# Patient Record
Sex: MALE | Race: WHITE | NOT HISPANIC OR LATINO | Employment: OTHER | URBAN - METROPOLITAN AREA
[De-identification: names, ages, dates, MRNs, and addresses within clinical notes are randomized per-mention and may not be internally consistent; named-entity substitution may affect disease eponyms.]

---

## 2019-03-14 ENCOUNTER — HOSPITAL ENCOUNTER (OUTPATIENT)
Dept: RADIOLOGY | Facility: MEDICAL CENTER | Age: 64
End: 2019-03-14
Attending: NURSE PRACTITIONER
Payer: COMMERCIAL

## 2019-03-14 ENCOUNTER — OFFICE VISIT (OUTPATIENT)
Dept: URGENT CARE | Facility: PHYSICIAN GROUP | Age: 64
End: 2019-03-14
Payer: COMMERCIAL

## 2019-03-14 VITALS
HEIGHT: 71 IN | WEIGHT: 200 LBS | DIASTOLIC BLOOD PRESSURE: 80 MMHG | HEART RATE: 61 BPM | RESPIRATION RATE: 14 BRPM | BODY MASS INDEX: 28 KG/M2 | SYSTOLIC BLOOD PRESSURE: 122 MMHG | OXYGEN SATURATION: 96 % | TEMPERATURE: 97.9 F

## 2019-03-14 DIAGNOSIS — M54.42 ACUTE LEFT-SIDED LOW BACK PAIN WITH LEFT-SIDED SCIATICA: Primary | ICD-10-CM

## 2019-03-14 DIAGNOSIS — M54.42 ACUTE LEFT-SIDED LOW BACK PAIN WITH LEFT-SIDED SCIATICA: ICD-10-CM

## 2019-03-14 DIAGNOSIS — S39.012A STRAIN OF LUMBAR REGION, INITIAL ENCOUNTER: ICD-10-CM

## 2019-03-14 PROCEDURE — 72100 X-RAY EXAM L-S SPINE 2/3 VWS: CPT

## 2019-03-14 PROCEDURE — 99204 OFFICE O/P NEW MOD 45 MIN: CPT | Performed by: NURSE PRACTITIONER

## 2019-03-14 RX ORDER — HYDROCODONE BITARTRATE AND ACETAMINOPHEN 5; 325 MG/1; MG/1
1 TABLET ORAL EVERY 8 HOURS PRN
Qty: 10 TAB | Refills: 0 | Status: SHIPPED | OUTPATIENT
Start: 2019-03-14 | End: 2019-03-18

## 2019-03-14 RX ORDER — LISINOPRIL 20 MG/1
20 TABLET ORAL DAILY
COMMUNITY

## 2019-03-14 RX ORDER — METHYLPREDNISOLONE 4 MG/1
TABLET ORAL
Qty: 1 KIT | Refills: 0 | Status: SHIPPED | OUTPATIENT
Start: 2019-03-14

## 2019-03-14 RX ORDER — CYCLOBENZAPRINE HCL 10 MG
10 TABLET ORAL 3 TIMES DAILY PRN
Qty: 30 TAB | Refills: 0 | Status: SHIPPED | OUTPATIENT
Start: 2019-03-14

## 2019-03-14 ASSESSMENT — ENCOUNTER SYMPTOMS
NUMBNESS: 0
BACK PAIN: 1
NEUROLOGICAL NEGATIVE: 1
SENSORY CHANGE: 0
BOWEL INCONTINENCE: 0
WEAKNESS: 0
PARESTHESIAS: 0
RESPIRATORY NEGATIVE: 1
TINGLING: 0
PARESIS: 0
CARDIOVASCULAR NEGATIVE: 1
CONSTITUTIONAL NEGATIVE: 1
FOCAL WEAKNESS: 0
PERIANAL NUMBNESS: 0

## 2019-03-14 NOTE — PROGRESS NOTES
Subjective:     Wallace Khalil is a 63 y.o. male who presents for Back Pain (going down left leg poss sciatica)       Back Pain   This is a chronic problem. Episode onset: 2 days ago. The problem has been gradually worsening since onset. The pain is at a severity of 7/10. Pertinent negatives include no bladder incontinence, bowel incontinence, numbness, paresis, paresthesias, perianal numbness, tingling or weakness. He has tried NSAIDs and analgesics (acetaminophen) for the symptoms. The treatment provided no relief.   Patient reports a history of chronic back pain for several years.  States that in 2012 he had imaging done of his lower back which showed herniation of L5-S1.  He states ever since his back pain has been stable.  Denies surgeries in his lower back.  He states that about 2 weeks ago he pulled something heavy and sprained his lower back.  About 2 days ago he started to experience acute left lower back pain radiating down the back of his left leg.    PMH:  has no past medical history on file.    MEDS:   Current Outpatient Prescriptions:   •  lisinopril (PRINIVIL) 20 MG Tab, Take 20 mg by mouth every day., Disp: , Rfl:   •  HYDROcodone-acetaminophen (NORCO) 5-325 MG Tab per tablet, Take 1 Tab by mouth every 8 hours as needed (severe pain) for up to 4 days., Disp: 10 Tab, Rfl: 0  •  MethylPREDNISolone (MEDROL DOSEPAK) 4 MG Tablet Therapy Pack, Use as directed on package, Disp: 1 Kit, Rfl: 0  •  cyclobenzaprine (FLEXERIL) 10 MG Tab, Take 1 Tab by mouth 3 times a day as needed for Muscle Spasms., Disp: 30 Tab, Rfl: 0    ALLERGIES: No Known Allergies    SURGHX: No past surgical history on file.    SOCHX:  reports that he has never smoked. He has never used smokeless tobacco. He reports that he does not drink alcohol or use drugs.     FH: Reviewed with patient, not pertinent to this visit.     Review of Systems   Constitutional: Negative.    Respiratory: Negative.    Cardiovascular: Negative.   "  Gastrointestinal: Negative for bowel incontinence.   Genitourinary: Negative for bladder incontinence.   Musculoskeletal: Positive for back pain.   Neurological: Negative.  Negative for tingling, sensory change, focal weakness, weakness, numbness and paresthesias.   All other systems reviewed and are negative.    Objective:     /80   Pulse 61   Temp 36.6 °C (97.9 °F) (Temporal)   Resp 14   Ht 1.803 m (5' 11\")   Wt 90.7 kg (200 lb)   SpO2 96%   BMI 27.89 kg/m²     Physical Exam   Constitutional: He is oriented to person, place, and time. He appears well-developed and well-nourished. He is cooperative.  Non-toxic appearance. No distress.   HENT:   Head: Normocephalic and atraumatic.   Right Ear: External ear normal.   Left Ear: External ear normal.   Nose: Nose normal.   Mouth/Throat: Mucous membranes are normal.   Eyes: Conjunctivae and EOM are normal.   Neck: Normal range of motion.   Cardiovascular: Normal rate, regular rhythm, normal heart sounds and normal pulses.    Pulmonary/Chest: Effort normal and breath sounds normal. No respiratory distress. He has no decreased breath sounds.   Abdominal: Soft. Bowel sounds are normal. There is no tenderness.   Musculoskeletal: He exhibits no deformity.        Lumbar back: He exhibits decreased range of motion (limited due to pain), tenderness (more towards left) and pain. He exhibits no swelling, no deformity and no laceration.   Neurological: He is alert and oriented to person, place, and time. He has normal strength. He displays normal reflexes. No sensory deficit. Coordination and gait normal.   Skin: Skin is warm, dry and intact. Capillary refill takes less than 2 seconds.   Psychiatric: He has a normal mood and affect. His behavior is normal.   Vitals reviewed.    X-ray of lumbar spine:    Narrative     3/14/2019 11:18 AM    HISTORY/REASON FOR EXAM: Acute left  sciatica and lower back pain    TECHNIQUE/ EXAM DESCRIPTION AND NUMBER OF VIEWS:  3 views of " the lumbar spine.    COMPARISON: None.    FINDINGS:  There are 5 non-rib bearing lumbar type vertebral bodies.    No acute fracture is detected. The vertebral body heights are maintained.    Straightening of the normal lordosis    The alignment of the lumbar spine is notable for mild degenerative L2/3 retrolisthesis and grade 1 L1/2 anterolisthesis.    The intervertebral disk spaces are moderately narrowed at L2/3 where there is vacuum disc phenomenon. There is mild disc height loss at L3/4/5/S1.    Mild caudal facet arthropathy     Impression     Moderate multilevel degenerative disc disease greater than facet arthropathy with L1/2 grade 1 anterolisthesis, L2/3 retrolisthesis     Reading Provider Reading Date   Jordi Webber M.D. Mar 14, 2019     Signing Provider Signing Date Signing Time   Jordi Webber M.D. Mar 14, 2019 11:45 AM        Assessment/Plan:     1. Acute left-sided low back pain with left-sided sciatica  - DX-LUMBAR SPINE-2 OR 3 VIEWS; Future  - HYDROcodone-acetaminophen (NORCO) 5-325 MG Tab per tablet; Take 1 Tab by mouth every 8 hours as needed (severe pain) for up to 4 days.  Dispense: 10 Tab; Refill: 0  - Consent for Opiate Prescription  - cyclobenzaprine (FLEXERIL) 10 MG Tab; Take 1 Tab by mouth 3 times a day as needed for Muscle Spasms.  Dispense: 30 Tab; Refill: 0  - REFERRAL TO SPINE SURGERY    2. Strain of lumbar region, initial encounter  - MethylPREDNISolone (MEDROL DOSEPAK) 4 MG Tablet Therapy Pack; Use as directed on package  Dispense: 1 Kit; Refill: 0    Rx given as specified above. Pt advised to avoid NSAIDs while on steroids. Referral given for consultation. [place] and [place]  inquiries and Opioid Risk Tool show no increased risk of controlled substance abuse for this patient. Written consent received for prescription of controlled substance for adequate control of pain.    Patient advised to: Return for 1) Symptoms don't improve or worsen, or go to ER, 2) Follow up with  primary care in 7-10 days.    Differential diagnosis, natural history, supportive care, and indications for immediate follow-up discussed. All questions answered. Patient agrees with the plan of care.

## 2019-03-14 NOTE — PATIENT INSTRUCTIONS
Back Pain, Adult  Back pain is very common in adults. The cause of back pain is rarely dangerous and the pain often gets better over time. The cause of your back pain may not be known. Some common causes of back pain include:  · Strain of the muscles or ligaments supporting the spine.  · Wear and tear (degeneration) of the spinal disks.  · Arthritis.  · Direct injury to the back.  For many people, back pain may return. Since back pain is rarely dangerous, most people can learn to manage this condition on their own.  Follow these instructions at home:  Watch your back pain for any changes. The following actions may help to lessen any discomfort you are feeling:  · Remain active. It is stressful on your back to sit or  one place for long periods of time. Do not sit, drive, or  one place for more than 30 minutes at a time. Take short walks on even surfaces as soon as you are able. Try to increase the length of time you walk each day.  · Exercise regularly as directed by your health care provider. Exercise helps your back heal faster. It also helps avoid future injury by keeping your muscles strong and flexible.  · Do not stay in bed. Resting more than 1-2 days can delay your recovery.  · Pay attention to your body when you bend and lift. The most comfortable positions are those that put less stress on your recovering back. Always use proper lifting techniques, including:  ¨ Bending your knees.  ¨ Keeping the load close to your body.  ¨ Avoiding twisting.  · Find a comfortable position to sleep. Use a firm mattress and lie on your side with your knees slightly bent. If you lie on your back, put a pillow under your knees.  · Avoid feeling anxious or stressed. Stress increases muscle tension and can worsen back pain. It is important to recognize when you are anxious or stressed and learn ways to manage it, such as with exercise.  · Take medicines only as directed by your health care provider.  Over-the-counter medicines to reduce pain and inflammation are often the most helpful. Your health care provider may prescribe muscle relaxant drugs. These medicines help dull your pain so you can more quickly return to your normal activities and healthy exercise.  · Apply ice to the injured area:  ¨ Put ice in a plastic bag.  ¨ Place a towel between your skin and the bag.  ¨ Leave the ice on for 20 minutes, 2-3 times a day for the first 2-3 days. After that, ice and heat may be alternated to reduce pain and spasms.  · Maintain a healthy weight. Excess weight puts extra stress on your back and makes it difficult to maintain good posture.  Contact a health care provider if:  · You have pain that is not relieved with rest or medicine.  · You have increasing pain going down into the legs or buttocks.  · You have pain that does not improve in one week.  · You have night pain.  · You lose weight.  · You have a fever or chills.  Get help right away if:  · You develop new bowel or bladder control problems.  · You have unusual weakness or numbness in your arms or legs.  · You develop nausea or vomiting.  · You develop abdominal pain.  · You feel faint.  This information is not intended to replace advice given to you by your health care provider. Make sure you discuss any questions you have with your health care provider.  Document Released: 12/18/2006 Document Revised: 04/27/2017 Document Reviewed: 04/21/2015  [employer] Interactive Patient Education © 2017 [employer]

## 2019-03-25 DIAGNOSIS — J45.909 UNCOMPLICATED ASTHMA, UNSPECIFIED ASTHMA SEVERITY, UNSPECIFIED WHETHER PERSISTENT: ICD-10-CM

## 2019-04-09 ENCOUNTER — NON-PROVIDER VISIT (OUTPATIENT)
Dept: PULMONOLOGY | Facility: HOSPICE | Age: 64
End: 2019-04-09
Attending: INTERNAL MEDICINE
Payer: COMMERCIAL

## 2019-04-09 ENCOUNTER — OFFICE VISIT (OUTPATIENT)
Dept: PULMONOLOGY | Facility: HOSPICE | Age: 64
End: 2019-04-09
Payer: COMMERCIAL

## 2019-04-09 VITALS
WEIGHT: 199 LBS | HEIGHT: 71 IN | HEART RATE: 54 BPM | OXYGEN SATURATION: 96 % | RESPIRATION RATE: 14 BRPM | BODY MASS INDEX: 27.86 KG/M2 | SYSTOLIC BLOOD PRESSURE: 118 MMHG | TEMPERATURE: 97.7 F | DIASTOLIC BLOOD PRESSURE: 78 MMHG

## 2019-04-09 VITALS — WEIGHT: 199 LBS | HEIGHT: 71 IN | BODY MASS INDEX: 27.86 KG/M2

## 2019-04-09 DIAGNOSIS — J45.909 UNCOMPLICATED ASTHMA, UNSPECIFIED ASTHMA SEVERITY, UNSPECIFIED WHETHER PERSISTENT: ICD-10-CM

## 2019-04-09 DIAGNOSIS — J45.30 MILD PERSISTENT ASTHMA WITHOUT COMPLICATION: ICD-10-CM

## 2019-04-09 PROCEDURE — 94060 EVALUATION OF WHEEZING: CPT | Performed by: INTERNAL MEDICINE

## 2019-04-09 PROCEDURE — 99204 OFFICE O/P NEW MOD 45 MIN: CPT | Mod: 25 | Performed by: INTERNAL MEDICINE

## 2019-04-09 PROCEDURE — 94729 DIFFUSING CAPACITY: CPT | Performed by: INTERNAL MEDICINE

## 2019-04-09 PROCEDURE — 94726 PLETHYSMOGRAPHY LUNG VOLUMES: CPT | Performed by: INTERNAL MEDICINE

## 2019-04-09 RX ORDER — ACYCLOVIR 200 MG/1
CAPSULE ORAL
Refills: 0 | COMMUNITY
Start: 2019-03-01

## 2019-04-09 RX ORDER — OSELTAMIVIR PHOSPHATE 75 MG/1
CAPSULE ORAL
Refills: 0 | COMMUNITY
Start: 2019-02-02

## 2019-04-09 RX ORDER — MELOXICAM 15 MG/1
15 TABLET ORAL
Refills: 0 | COMMUNITY
Start: 2019-03-09

## 2019-04-09 RX ORDER — CLONIDINE HYDROCHLORIDE 0.1 MG/1
TABLET ORAL
Refills: 0 | COMMUNITY
Start: 2019-03-28

## 2019-04-09 RX ORDER — MONTELUKAST SODIUM 10 MG/1
10 TABLET ORAL
Refills: 1 | COMMUNITY
Start: 2019-03-09

## 2019-04-09 RX ORDER — DOXYCYCLINE HYCLATE 100 MG/1
CAPSULE ORAL
Refills: 0 | COMMUNITY
Start: 2019-03-02

## 2019-04-09 RX ORDER — LAMOTRIGINE 25 MG/1
TABLET ORAL
Refills: 0 | COMMUNITY
Start: 2019-02-08

## 2019-04-09 RX ORDER — AZITHROMYCIN 250 MG/1
TABLET, FILM COATED ORAL
Refills: 0 | COMMUNITY
Start: 2019-02-02

## 2019-04-09 RX ORDER — GUAIFENESIN AND CODEINE PHOSPHATE 100; 10 MG/5ML; MG/5ML
SYRUP ORAL
Refills: 0 | COMMUNITY
Start: 2019-02-02

## 2019-04-09 RX ORDER — DEXAMETHASONE 4 MG/1
TABLET ORAL
Refills: 0 | COMMUNITY
Start: 2019-03-04

## 2019-04-09 RX ORDER — LISINOPRIL AND HYDROCHLOROTHIAZIDE 12.5; 1 MG/1; MG/1
1 TABLET ORAL
Refills: 0 | COMMUNITY
Start: 2019-04-08

## 2019-04-09 RX ORDER — ALLOPURINOL 300 MG/1
TABLET ORAL
Refills: 1 | COMMUNITY
Start: 2019-03-09

## 2019-04-09 RX ORDER — LAMOTRIGINE 100 MG/1
TABLET ORAL
Refills: 0 | COMMUNITY
Start: 2019-04-08

## 2019-04-09 RX ORDER — BUDESONIDE AND FORMOTEROL FUMARATE DIHYDRATE 160; 4.5 UG/1; UG/1
2 AEROSOL RESPIRATORY (INHALATION) 2 TIMES DAILY
Qty: 1 INHALER | Refills: 11 | Status: SHIPPED | OUTPATIENT
Start: 2019-04-09

## 2019-04-09 RX ORDER — PREDNISONE 20 MG/1
TABLET ORAL
Refills: 0 | COMMUNITY
Start: 2019-03-01

## 2019-04-09 ASSESSMENT — PULMONARY FUNCTION TESTS
FEV1: 3.32
FEV1/FVC_PERCENT_PREDICTED: 98
FEV1/FVC: 78.19
FEV1/FVC: 74
FEV1_PERCENT_PREDICTED: 92
FEV1_PERCENT_CHANGE: 0
FEV1/FVC_PERCENT_PREDICTED: 104
FVC_PERCENT_PREDICTED: 94
FEV1_LLN: 2.99
FVC: 4.54
FEV1_PREDICTED: 3.58
FVC_PREDICTED: 4.79
FEV1/FVC: 74
FEV1/FVC_PREDICTED: 75
FEV1/FVC: 78
FVC: 4.51
FEV1/FVC_PERCENT_PREDICTED: 98
FEV1/FVC_PERCENT_PREDICTED: 75
FEV1_PERCENT_CHANGE: 7
FEV1/FVC_PERCENT_LLN: 63
FVC_LLN: 4.00
FEV1_PERCENT_PREDICTED: 99
FEV1/FVC_PERCENT_CHANGE: 6
FEV1/FVC_PERCENT_PREDICTED: 105
FEV1: 3.55
FVC_PERCENT_PREDICTED: 94

## 2019-04-09 ASSESSMENT — PAIN SCALES - GENERAL: PAINLEVEL: 2=MINIMAL-SLIGHT

## 2019-04-09 NOTE — PROCEDURES
Technician: JANEEN Grace    Technician Comment:  Good patient effort & cooperation.  The results of this test meet the ATS/ERS standards for acceptability & reproducibility.  Test was performed on the [employer] Body Plethysmograph-Elite DX system.  Predicted values were [facility]-3 for spirometry, [facility] for DLCO, ITS for Lung Volumes.  The DLCO was uncorrected for Hgb.  A bronchodilator of Ventolin HFA -2puffs via spacer administered.  DLCO performed during dilation period.    1. Baseline spirometry demonstrates a normal  FEV1 and FVC. FEV1/FVC ratio is normal at 74%.    2. After administration of an inhaled bronchodilator there is 7% improvement in FEV1.    3. Lung volumes demonstrate a normal total lung capacity at 101% of predicted.    4. Gas exchange as estimated by DLCO is normal at 109% of predicted.    5. Airway resistance is normal.      Impression:    This study demonstrates the presence of mild obstructive lung disease.  Partial reversibility is noted on the study.

## 2019-04-09 NOTE — LETTER
Carl Moore M.D.  [facility]monary [facility]   [address] [place] [address] [place] [address]  Phone: [phone] - Fax: [phone]           Encounter Date: 4/9/2019  Provider: Carl Moore M.D.  Location of Care: [facility] PULMONARY [facility]ICINE      Patient:   Wallace Khalil   MR Number: [id number]   YOB: 1955     PROGRESS NOTE:  Chief Complaint   Patient presents with   • New Patient     Asthma       HPI:  The patient is a 63-year-old man with a history of asthma for the past 10 years or so.  He was initially diagnosed when he lived in [facility].  He now lives in [facility] which is near [place].  Patient had a recent pneumonia in January.  His imaging is in [place] and I was not able to review that chest x-ray.  He is now back to baseline.  His medications include Singulair and albuterol.  He does have an allergic history and seems to be especially allergic to rufus.  He has not been on inhaled corticosteroids.  Pulmonary function testing today demonstrates an FEV1 of 3.32 L which is 92% of predicted.  His FEV1 FVC ratio is 74%.  There was a 7% improvement in FEV1 after an inhaled bronchodilator.  DLCO was normal.    Past Medical History:   Diagnosis Date   • Asthma    • Back pain    • Bronchitis    • Chickenpox    • Cough    • Depression    • Heartburn    • Influenza    • Insomnia    • Mumps    • Nasal drainage    • Painful joint    • Pneumonia    • Ringing in ears    • Sore muscles    • Tonsillitis    • Wears glasses    • Wheezing        ROS:   Constitutional: Denies fevers, chills, night sweats, fatigue or weight loss  Eyes: Denies vision loss, pain, drainage, double vision  Ears, Nose, Throat: Denies earache, tinnitus, hoarseness  Cardiovascular: Denies chest pain, tightness, palpitations  Respiratory: See HPI  Sleep: Denies, snoring, apnea  GI: Denies abdominal pain, nausea, vomiting, diarrhea  : Denies frequent urination, hematuria,  "painful urination  Musculoskeletal: Denies back pain, painful joints, sore muscles  Neurological: Denies headaches, seizures  Skin: Denies rashes, color changes  Psychiatric: Denies depression or thoughts of suicide  Hematologic: Denies bleeding tendency or clotting tendency  Allergic/Immunologic: Denies rhinitis, skin sensitivity    Social History     Social History   • Marital status:      Spouse name: N/A   • Number of children: N/A   • Years of education: N/A     Occupational History   • Not on file.     Social History Main Topics   • Smoking status: Never Smoker   • Smokeless tobacco: Never Used   • Alcohol use Yes      Comment: Beer on the weekends   • Drug use: No   • Sexual activity: Not on file     Other Topics Concern   • Not on file     Social History Narrative   • No narrative on file     Seasonal  Current Outpatient Prescriptions on File Prior to Visit   Medication Sig Dispense Refill   • lisinopril (PRINIVIL) 20 MG Tab Take 20 mg by mouth every day.     • MethylPREDNISolone (MEDROL DOSEPAK) 4 MG Tablet Therapy Pack Use as directed on package (Patient not taking: Reported on 4/9/2019) 1 Kit 0   • cyclobenzaprine (FLEXERIL) 10 MG Tab Take 1 Tab by mouth 3 times a day as needed for Muscle Spasms. (Patient not taking: Reported on 4/9/2019) 30 Tab 0     No current facility-administered medications on file prior to visit.      /78   Pulse (!) 54   Temp 36.5 °C (97.7 °F) (Oral)   Resp 14   Ht 1.803 m (5' 11\")   Wt 90.3 kg (199 lb)   SpO2 96%   Family History   Problem Relation Age of Onset   • COPD Mother    • Stroke Father        Physical Exam:    HEENT: PERRLA, EOMI, no scleral icterus, no nasal or oral lesions  Neck: No thyromegaly, no adenopathy, no bruits  Mallampatti: Grade II  Lungs: Equal breath sounds, no wheezes or crackles  Heart: Regular rate and rhythm, no gallops or murmurs  Abdomen: Soft, benign, no organomegaly  Extremities: No clubbing, cyanosis, or edema  Neurologic: " Cranial nerve, motor, and sensory exam are normal    1. Mild persistent asthma without complication        He has a history and pulmonary function testing compatible with a diagnosis of mild persistent asthma.  He does wheeze with significant activity.  He may benefit from the addition of an inhaled corticosteroid.  We spent time discussing the concepts of inflammation and bronchodilation.  We will start him on Symbicort 160/4.5.  He will use 2 puffs via spacer twice daily and rinse his mouth.  He will follow-up in [place].  We will see him as needed.      Electronically signed by Carl Moore M.D.  on 04/11/19    No Recipients

## 2019-04-12 NOTE — PROGRESS NOTES
Chief Complaint   Patient presents with   • New Patient     Asthma       HPI:  The patient is a 63-year-old man with a history of asthma for the past 10 years or so.  He was initially diagnosed when he lived in [facility].  He now lives in [facility] [place] which is near [place].  Patient had a recent pneumonia in January.  His imaging is in [place] and I was not able to review that chest x-ray.  He is now back to baseline.  His medications include Singulair and albuterol.  He does have an allergic history and seems to be especially allergic to rufus.  He has not been on inhaled corticosteroids.  Pulmonary function testing today demonstrates an FEV1 of 3.32 L which is 92% of predicted.  His FEV1 FVC ratio is 74%.  There was a 7% improvement in FEV1 after an inhaled bronchodilator.  DLCO was normal.    Past Medical History:   Diagnosis Date   • Asthma    • Back pain    • Bronchitis    • Chickenpox    • Cough    • Depression    • Heartburn    • Influenza    • Insomnia    • Mumps    • Nasal drainage    • Painful joint    • Pneumonia    • Ringing in ears    • Sore muscles    • Tonsillitis    • Wears glasses    • Wheezing        ROS:   Constitutional: Denies fevers, chills, night sweats, fatigue or weight loss  Eyes: Denies vision loss, pain, drainage, double vision  Ears, Nose, Throat: Denies earache, tinnitus, hoarseness  Cardiovascular: Denies chest pain, tightness, palpitations  Respiratory: See HPI  Sleep: Denies, snoring, apnea  GI: Denies abdominal pain, nausea, vomiting, diarrhea  : Denies frequent urination, hematuria, painful urination  Musculoskeletal: Denies back pain, painful joints, sore muscles  Neurological: Denies headaches, seizures  Skin: Denies rashes, color changes  Psychiatric: Denies depression or thoughts of suicide  Hematologic: Denies bleeding tendency or clotting tendency  Allergic/Immunologic: Denies rhinitis, skin sensitivity    Social History     Social History   • Marital status:  "     Spouse name: N/A   • Number of children: N/A   • Years of education: N/A     Occupational History   • Not on file.     Social History Main Topics   • Smoking status: Never Smoker   • Smokeless tobacco: Never Used   • Alcohol use Yes      Comment: Beer on the weekends   • Drug use: No   • Sexual activity: Not on file     Other Topics Concern   • Not on file     Social History Narrative   • No narrative on file     Seasonal  Current Outpatient Prescriptions on File Prior to Visit   Medication Sig Dispense Refill   • lisinopril (PRINIVIL) 20 MG Tab Take 20 mg by mouth every day.     • MethylPREDNISolone (MEDROL DOSEPAK) 4 MG Tablet Therapy Pack Use as directed on package (Patient not taking: Reported on 4/9/2019) 1 Kit 0   • cyclobenzaprine (FLEXERIL) 10 MG Tab Take 1 Tab by mouth 3 times a day as needed for Muscle Spasms. (Patient not taking: Reported on 4/9/2019) 30 Tab 0     No current facility-administered medications on file prior to visit.      /78   Pulse (!) 54   Temp 36.5 °C (97.7 °F) (Oral)   Resp 14   Ht 1.803 m (5' 11\")   Wt 90.3 kg (199 lb)   SpO2 96%   Family History   Problem Relation Age of Onset   • COPD Mother    • Stroke Father        Physical Exam:    HEENT: PERRLA, EOMI, no scleral icterus, no nasal or oral lesions  Neck: No thyromegaly, no adenopathy, no bruits  Mallampatti: Grade II  Lungs: Equal breath sounds, no wheezes or crackles  Heart: Regular rate and rhythm, no gallops or murmurs  Abdomen: Soft, benign, no organomegaly  Extremities: No clubbing, cyanosis, or edema  Neurologic: Cranial nerve, motor, and sensory exam are normal    1. Mild persistent asthma without complication        He has a history and pulmonary function testing compatible with a diagnosis of mild persistent asthma.  He does wheeze with significant activity.  He may benefit from the addition of an inhaled corticosteroid.  We spent time discussing the concepts of inflammation and bronchodilation.  " We will start him on Symbicort 160/4.5.  He will use 2 puffs via spacer twice daily and rinse his mouth.  He will follow-up in [place].  We will see him as needed.